# Patient Record
Sex: FEMALE | Race: WHITE
[De-identification: names, ages, dates, MRNs, and addresses within clinical notes are randomized per-mention and may not be internally consistent; named-entity substitution may affect disease eponyms.]

---

## 2018-08-27 ENCOUNTER — HOSPITAL ENCOUNTER (INPATIENT)
Dept: HOSPITAL 7 - FB.OBCHECK | Age: 33
LOS: 2 days | Discharge: HOME | DRG: 560 | End: 2018-08-29
Attending: FAMILY MEDICINE | Admitting: FAMILY MEDICINE
Payer: COMMERCIAL

## 2018-08-27 DIAGNOSIS — Z3A.39: ICD-10-CM

## 2018-08-27 RX ADMIN — Medication PRN ML: at 20:03

## 2018-08-27 RX ADMIN — Medication PRN ML: at 12:25

## 2018-08-27 NOTE — PCM.LDHP
L&D History of Present Illness





- General


Date of Service: 18


Admit Problem/Dx: 


 Patient Status Order with Admit Dx/Problem





18 08:20


Patient Status [ADT] Routine 








 Admission Diagnosis/Problem











Admission Diagnosis/Problem    Pregnancy














Source of Information: Patient


History Limitations: Reports: No Limitations





- History of Present Illness


Introduction:: 


32 yo  at term,with rapture of membranes


Pain Score: 3





- Related Data


Allergies/Adverse Reactions: 


 Allergies











Allergy/AdvReac Type Severity Reaction Status Date / Time


 


No Known Allergies Allergy   Verified 16 15:40











Home Medications: 


 Home Meds





PNV95/Ferrous Fumarate/FA [Prenatal Tablet] 1 each PO DAILY 16 [History]











Past Medical History





- Past Health History


Medical/Surgical History: Denies Medical/Surgical History


OB/GYN History: Reports: Pregnancy





Social & Family History





- Family History


Cardiac: Reports: Hypertension, Other (See Below)


Other Cardiac Family History: vascular disease


OBGYN: Reports: Pregnancy


Psychiatric: Reports: Anxiety


Hematologic: Reports: None


Oncologic: Reports: Breast





- Tobacco Use


Smoking Status *Q: Never Smoker


Second Hand Smoke Exposure: No





- Caffeine Use


Caffeine Use: Reports: Coffee, Soda





- Recreational Drug Use


Recreational Drug Use: No





H&P Review of Systems





- Review of Systems:


Review Of Systems: ROS reveals no pertinent complaints other than HPI.





L&D Exam





- Exam


Exam: See Below





- Vital Signs


Vital Signs: 


 Last Vital Signs











Temp  98 F   18 21:24


 


Pulse  79   18 22:48


 


Resp  18   18 21:24


 


BP  119/75   18 22:14


 


Pulse Ox  95   18 09:24











Weight: 79.379 kg





- OB Specific


Contraction Duration (sec): 60


Contraction Frequency (min): 2.5


Contraction Intensity: Moderate to Strong





- Ballard Score


Ballard Score Cervix Position: Midposition


Ballard Score Consistency: Soft


Ballard Score Dilation: 1-2 cm


Ballard Score Infant's Station: -2





- Exam


General: Alert, Oriented


HEENT: PERRLA, Conjunctiva Clear, EACs Clear, EOMI, Hearing Intact, Mucosa 

Moist & Pink, Nares Patent, Normal Nasal Septum, Posterior Pharynx Clear, TMs 

Clear


Neck: Supple, Trachea Midline


Lungs: Clear to Auscultation, Normal Respiratory Effort


Cardiovascular: Regular Rate, Regular Rhythm


GI/Abdominal Exam: Normal Bowel Sounds, Soft, Non-Tender, No Organomegaly, No 

Distention, No Abnormal Bruit, No Mass, Pelvis Stable


Rectal Exam: Normal Exam, Normal Rectal Tone


Genitourinary: Normal external exam, Normal bimanual exam, Normal speculum exam


Back Exam: Normal Inspection, Full Range of Motion


Extremities: Normal Inspection, Normal Range of Motion, Non-Tender, No Pedal 

Edema, Normal Capillary Refill


Skin: Warm, Dry, Intact


Neurological: Cranial Nerves Intact, Reflexes Equal Bilateral


Psychiatric: Alert, Normal Affect, Normal Mood





- Problem List


(1) Rupture of membranes with delay of delivery


Status: Acute   Current Visit: Yes   





(2) Prolonged first stage of labor, antepartum


SNOMED Code(s): 173818597


   ICD Code: O63.0 - PROLONGED FIRST STAGE (OF LABOR)   Status: Acute   Current 

Visit: Yes   





(3) Pregnancy


SNOMED Code(s): 43348826


   ICD Code: Z33.1 - PREGNANT STATE, INCIDENTAL   Status: Acute   Current Visit

: No   


Qualifiers: 


   Weeks of gestation: 39 weeks   Qualified Code(s): Z3A.39 - 39 weeks 

gestation of pregnancy   


Problem List Initiated/Reviewed/Updated: Yes


Orders Last 24hrs: 


 Active Orders 24 hr











 Category Date Time Status


 


 Patient Status [ADT] Routine ADT  18 08:20 Active


 


 Communication Order [RC] ASDIRECTED Care  18 09:24 Active


 


 Communication Order [RC] ASDIRECTED Care  18 11:02 Active


 


 Communication Order [RC] ASDIRECTED Care  18 11:02 Active


 


 Communication Order [RC] ASDIRECTED Care  18 11:02 Active


 


 Communication Order [RC] ASDIRECTED Care  18 11:02 Active


 


 Fetal Non Stress Test [RC] Click to Edit Care  18 11:02 Active


 


 Notify Provider Vital Signs [RC] PRN Care  18 09:27 Active


 


 Notify Provider [RC] PRN Care  18 09:24 Active


 


 Notify Provider [RC] PRN Care  18 11:02 Active


 


 Notify Provider [RC] STAT Care  18 11:02 Active


 


 Vital Signs [RC] PER UNIT ROUTINE Care  18 11:02 Active


 


 Lactated Ringers [Ringers, Lactated] 1,000 ml Med  18 12:00 Active





 IV ASDIRECTED   


 


 Nalbuphine [Nubain] Med  18 19:43 Active





 10 mg IVPUSH Q3H PRN   


 


 Oxytocin/Normal Saline [Pitocin in NS 20 Units/1,000 ML Med  18 12:00 

Active





 ]   





 20 unit in 1,000 ml IV TITRATE   


 


 Sodium Chloride 0.9% [Saline Flush] Med  18 09:24 Active





 10 ml FLUSH ASDIRECTED PRN   


 


 Electronic Fetal Heart Tones Ext w TOCO [WOMSER] Per Oth  18 09:24 

Ordered





 Unit Routine   


 


 Peripheral IV Insertion Adult [OM.PC] Urgent Oth  18 11:01 Ordered


 


 Saline Lock Insert [OM.PC] Routine Oth  18 09:24 Ordered


 


 Resuscitation Status Routine Resus Stat  18 09:24 Ordered








 Medication Orders





Lactated Ringer's (Ringers, Lactated)  1,000 mls @ 125 mls/hr IV ASDIRECTED BERNARDO


   Last Admin: 18 20:04  Dose: 125 mls/hr


   Infusion: 18 20:04  Dose: 125 mls/hr


   Admin: 18 12:25  Dose: 125 mls/hr


Oxytocin/Sodium Chloride (Pitocin In Ns 20 Units/1,000 Ml)  20 unit in 1,000 

mls @ 6 mls/hr IV TITRATE BERNARDO; Protocol


   Last Titration: 18 17:25  Dose: 14 munits/min, 42 mls/hr


   Titration: 18 16:29  Dose: 12 munits/min, 36 mls/hr


   Titration: 18 14:36  Dose: 8 munits/min, 24 mls/hr


   Titration: 18 13:50  Dose: 6 munits/min, 18 mls/hr


   Titration: 18 13:13  Dose: 4 munits/min, 12 mls/hr


   Admin: 18 12:26  Dose: 2 munits/min, 6 mls/hr


Nalbuphine HCl (Nubain)  10 mg IVPUSH Q3H PRN


   PRN Reason: Pain


   Last Admin: 18 20:01  Dose: 10 mg


Sodium Chloride (Saline Flush)  10 ml FLUSH ASDIRECTED PRN


   PRN Reason: Keep Vein Open


   Last Admin: 18 20:03  Dose: 10 ml


   Admin: 18 12:25  Dose: 10 ml








Assessment/Plan Comment:: 


Augment labor with Pitocin. Imminent delivery

## 2018-08-28 NOTE — DEL
DATE OF DELIVERY:  2018

 

PREOPERATIVE DIAGNOSIS:  Full-term, G3, P2.

 

POSTOPERATIVE DIAGNOSES:

1.Shoulder dystocia.

2. Spontaneous vaginal vertex delivery.

 

BRIEF HISTORY:  This is a 33-year-old female who presented yesterday with PROM.

Augmentin was started for augmentation of labor.  Pregnancy has been mostly

unremarkable.

 

DELIVERY NOTE:  At around 12 o'clock, she was complete and pushing.  After the

head was delivered, nuchal cord was reduced.  There was some difficulty in

delivery of the anterior shoulder, and a shouolder dystocia ensued.Using 
Benjamin' maneuver and some

suprapubic pressure with gentle traction, the baby's anterior shoulder 
delivered after 30-45 seconds.

Thereafter, the entire body was delivered.  The baby was placed in maternal

abdomen.  Cord was cut and clamped.  The baby was handed to the awaiting nurses.

Attention was turned to the mother, where the placenta was delivered shortly

afterwards by traction.  Pitocin was then initiated to achieve hemostasis along

with bimanual palpation.  Placenta was three vessel and intact.  There were no

lacerations encountered in the perineum.  Both the mother and the baby are

stable in the delivery room, and the baby was vigorous with Apgar scores of 8

and 9.  Weight is pending at the time of this dictation.

 

Job#: [id number]

DD: 2018 [id number]

DT: 2018 1646 TN/ZAY DURHAM

## 2018-08-29 VITALS — DIASTOLIC BLOOD PRESSURE: 75 MMHG | SYSTOLIC BLOOD PRESSURE: 133 MMHG

## 2018-08-29 NOTE — PCM.DCSUM1
**Discharge Summary





- Hospital Course


Free Text/Narrative:: 


Full aminta  delivery





- Discharge Data


Discharge Date: 18


Discharge Disposition: Home, Self-Care 01


Condition: Good





- Discharge Diagnosis/Problem(s)


(1) Rupture of membranes with delay of delivery


Status: Acute   Current Visit: Yes   





(2) Prolonged first stage of labor, antepartum


SNOMED Code(s): 338390024


   ICD Code: O63.0 - PROLONGED FIRST STAGE (OF LABOR)   Status: Acute   Current 

Visit: Yes   





(3) Pregnancy


SNOMED Code(s): 30204540


   ICD Code: Z33.1 - PREGNANT STATE, INCIDENTAL   Status: Acute   Current Visit

: No   


Qualifiers: 


   Weeks of gestation: 39 weeks   Qualified Code(s): Z3A.39 - 39 weeks 

gestation of pregnancy   





(4) Anemia


SNOMED Code(s): 425322354


   ICD Code: D64.9 - ANEMIA, UNSPECIFIED   Status: Acute   Current Visit: Yes   





- Discharge Plan


Home Medications: 


 Home Meds





PNV95/Ferrous Fumarate/FA [Prenatal Tablet] 1 each PO DAILY 16 [History]








Patient Handouts:  Vaginal Delivery, Postpartum Baby Blues, Home Care 

Instructions for Mom, Vaginal Delivery, Care After


Referrals: 


Alejandro Mejía MD [Primary Care Provider] -  (6 weeks)





- Discharge Summary/Plan Comment


DC Time >30 min.: Yes





- General Info


Date of Service: 18


Functional Status: Reports: Pain Controlled





- Review of Systems


General: Reports: No Symptoms


HEENT: Reports: No Symptoms


Pulmonary: Reports: No Symptoms


Cardiovascular: Reports: No Symptoms


Gastrointestinal: Reports: No Symptoms


Genitourinary: Reports: No Symptoms


Musculoskeletal: Reports: No Symptoms


Skin: Reports: No Symptoms


Neurological: Reports: No Symptoms


Psychiatric: Reports: No Symptoms





- Patient Data


Vitals - Most Recent: 


 Last Vital Signs











Temp  98.1 F   18 01:00


 


Pulse  74   18 01:00


 


Resp  18   18 01:00


 


BP  143/76 H  18 01:00


 


Pulse Ox  100   18 01:00











Weight - Most Recent: 79.379 kg


Med Orders - Current: 


 Current Medications





Lactated Ringer's (Ringers, Lactated)  1,000 mls @ 125 mls/hr IV ASDIRECTED BERNARDO


   Last Admin: 18 20:04 Dose:  125 mls/hr


Oxytocin/Sodium Chloride (Pitocin In Ns 20 Units/1,000 Ml)  20 unit in 1,000 

mls @ 6 mls/hr IV TITRATE BERNARDO; Protocol


   Last Titration: 18 17:25 Dose:  14 munits/min, 42 mls/hr


Ibuprofen (Motrin)  600 mg PO Q4H PRN


   PRN Reason: Pain


   Last Admin: 18 15:20 Dose:  600 mg


Nalbuphine HCl (Nubain)  10 mg IVPUSH Q3H PRN


   PRN Reason: Pain


   Last Admin: 18 20:01 Dose:  10 mg


Sodium Chloride (Saline Flush)  10 ml FLUSH ASDIRECTED PRN


   PRN Reason: Keep Vein Open


   Last Admin: 18 20:03 Dose:  10 ml











- Exam


General: Reports: Alert, Oriented


HEENT: Reports: Pupils Equal, Pupils Reactive, EOMI, Mucous Membr. Moist/Pink


Neck: Reports: Supple


Lungs: Reports: Clear to Auscultation, Normal Respiratory Effort


Cardiovascular: Reports: Regular Rate, Regular Rhythm


GI/Abdominal Exam: Normal Bowel Sounds, Soft, Non-Tender, No Organomegaly, No 

Distention, No Abnormal Bruit, No Mass, Pelvis Stable


 (Female) Exam: Normal External Exam, Normal Speculum Exam, Normal Bimanual 

Exam


Rectal (Female) Exam: Normal Exam, Normal Rectal Tone


Back Exam: Reports: Normal Inspection, Full Range of Motion


Extremities: Normal Inspection, Normal Range of Motion, Non-Tender, No Pedal 

Edema, Normal Capillary Refill


Skin: Reports: Warm, Dry, Intact


Wound/Incisions: Reports: Healing Well


Neurological: Reports: No New Focal Deficit


Psy/Mental Status: Reports: Alert, Normal Affect, Normal Mood

## 2018-08-29 NOTE — PCM.PNPP
- General Info


Date of Service: 18


Admission Dx/Problem (Free Text): 


 Patient Status Order with Admit Dx/Problem





18 08:20


Patient Status [ADT] Routine 








 Admission Diagnosis/Problem











Admission Diagnosis/Problem    Pregnancy














Functional Status: Reports: Pain Controlled





- Review of Systems


General: Reports: No Symptoms


HEENT: Reports: No Symptoms


Pulmonary: Reports: No Symptoms


Cardiovascular: Reports: No Symptoms


Gastrointestinal: Reports: No Symptoms


Genitourinary: Reports: No Symptoms


Musculoskeletal: Reports: No Symptoms


Skin: Reports: No Symptoms


Neurological: Reports: No Symptoms


Psychiatric: Reports: No Symptoms





- General Info


Date of Service: 18





- Patient Data


Vital Signs - Most Recent: 


 Last Vital Signs











Temp  98.1 F   18 01:00


 


Pulse  74   18 01:00


 


Resp  18   18 01:00


 


BP  143/76 H  18 01:00


 


Pulse Ox  100   18 01:00











Weight - Most Recent: 79.379 kg


Med Orders - Current: 


 Current Medications





Lactated Ringer's (Ringers, Lactated)  1,000 mls @ 125 mls/hr IV ASDIRECTED BERNARDO


   Last Admin: 18 20:04 Dose:  125 mls/hr


Oxytocin/Sodium Chloride (Pitocin In Ns 20 Units/1,000 Ml)  20 unit in 1,000 

mls @ 6 mls/hr IV TITRATE BERNARDO; Protocol


   Last Titration: 18 17:25 Dose:  14 munits/min, 42 mls/hr


Ibuprofen (Motrin)  600 mg PO Q4H PRN


   PRN Reason: Pain


   Last Admin: 18 15:20 Dose:  600 mg


Nalbuphine HCl (Nubain)  10 mg IVPUSH Q3H PRN


   PRN Reason: Pain


   Last Admin: 18 20:01 Dose:  10 mg


Sodium Chloride (Saline Flush)  10 ml FLUSH ASDIRECTED PRN


   PRN Reason: Keep Vein Open


   Last Admin: 18 20:03 Dose:  10 ml











- Infant Interaction


Infant Disposition, Postpartum: Butler in Room with Family


Infant Feeding: Bottle Fed Infant


Support Person: 





- Postpartum Recovery Exam


Fundal Tone: Firm


Fundal Level: At Umbilicus


Fundal Placement: Midline


Lochia Amount: Moderate


Lochia Color: Rubra/Red


Perineum Description: Intact, Minimal Bruising/Swelling


Episiotomy/Laceration: None


Bladder Status: Voiding


Urinary Elimination: Voided





- Exam


General: Alert, Oriented


HEENT: Pupils Equal


Neck: Supple


Lungs: Clear to Auscultation, Normal Respiratory Effort


Cardiovascular: Regular Rate, Regular Rhythm


GI/Abdominal Exam: Normal Bowel Sounds, Soft, Non-Tender, No Organomegaly, No 

Distention, No Abnormal Bruit, No Mass, Pelvis Stable


Extremities: Normal Inspection, Normal Range of Motion, Non-Tender, No Pedal 

Edema, Normal Capillary Refill


Skin: Warm, Dry, Intact


Wound/Incisions: Healing Well


Neurological: No New Focal Deficit


Psy/Mental Status: Alert, Normal Affect, Normal Mood





- Problem List & Annotations


(1) Rupture of membranes with delay of delivery


Status: Acute   Current Visit: Yes   





(2) Prolonged first stage of labor, antepartum


SNOMED Code(s): 212051689


   Code(s): O63.0 - PROLONGED FIRST STAGE (OF LABOR)   Status: Acute   Current 

Visit: Yes   





(3) Pregnancy


SNOMED Code(s): 04366298


   Code(s): Z33.1 - PREGNANT STATE, INCIDENTAL   Status: Acute   Current Visit: 

No   


Qualifiers: 


   Weeks of gestation: 39 weeks   Qualified Code(s): Z3A.39 - 39 weeks 

gestation of pregnancy   





(4) Anemia


SNOMED Code(s): 030644048


   Code(s): D64.9 - ANEMIA, UNSPECIFIED   Status: Acute   Current Visit: Yes   





- Problem List Review


Problem List Initiated/Reviewed/Updated: Yes





- My Orders


Last 24 Hours: 


My Active Orders





18 08:41


Resuscitation Status Routine 





18 Breakfast


Regular Diet [DIET] 














- Plan


Plan:: 


DC Home today